# Patient Record
Sex: FEMALE | Race: WHITE | Employment: FULL TIME | URBAN - METROPOLITAN AREA
[De-identification: names, ages, dates, MRNs, and addresses within clinical notes are randomized per-mention and may not be internally consistent; named-entity substitution may affect disease eponyms.]

---

## 2020-08-02 ENCOUNTER — HOSPITAL ENCOUNTER (EMERGENCY)
Facility: CLINIC | Age: 55
Discharge: HOME | End: 2020-08-02
Attending: STUDENT IN AN ORGANIZED HEALTH CARE EDUCATION/TRAINING PROGRAM | Admitting: STUDENT IN AN ORGANIZED HEALTH CARE EDUCATION/TRAINING PROGRAM
Payer: COMMERCIAL

## 2020-08-02 VITALS
RESPIRATION RATE: 14 BRPM | TEMPERATURE: 98.2 F | DIASTOLIC BLOOD PRESSURE: 82 MMHG | SYSTOLIC BLOOD PRESSURE: 137 MMHG | HEART RATE: 72 BPM | OXYGEN SATURATION: 95 %

## 2020-08-02 DIAGNOSIS — R56.9 NEW ONSET SEIZURE (H): ICD-10-CM

## 2020-08-02 DIAGNOSIS — E16.2 HYPOGLYCEMIA: ICD-10-CM

## 2020-08-02 PROBLEM — Z53.1: Status: ACTIVE | Noted: 2020-08-02

## 2020-08-02 PROBLEM — E11.9 TYPE 2 DIABETES MELLITUS WITHOUT COMPLICATION (H): Status: ACTIVE | Noted: 2020-08-02

## 2020-08-02 PROBLEM — B00.1 HERPES SIMPLEX LABIALIS: Status: ACTIVE | Noted: 2020-08-02

## 2020-08-02 PROBLEM — F33.42 RECURRENT MAJOR DEPRESSION IN FULL REMISSION (H): Status: ACTIVE | Noted: 2020-08-02

## 2020-08-02 PROBLEM — Z79.4 LONG TERM CURRENT USE OF INSULIN (H): Status: ACTIVE | Noted: 2020-08-02

## 2020-08-02 PROBLEM — R29.810 WEAKNESS OF FACE MUSCLES: Status: ACTIVE | Noted: 2020-08-02

## 2020-08-02 LAB
ALBUMIN SERPL-MCNC: 3.8 G/DL (ref 3.4–5)
ALP SERPL-CCNC: 65 U/L (ref 40–150)
ALT SERPL W P-5'-P-CCNC: 21 U/L (ref 0–50)
ANION GAP SERPL CALCULATED.3IONS-SCNC: 9 MMOL/L (ref 3–14)
AST SERPL W P-5'-P-CCNC: 13 U/L (ref 0–45)
BASOPHILS # BLD AUTO: 0.1 10E9/L (ref 0–0.2)
BASOPHILS NFR BLD AUTO: 0.9 %
BILIRUB DIRECT SERPL-MCNC: <0.1 MG/DL (ref 0–0.2)
BILIRUB SERPL-MCNC: 0.3 MG/DL (ref 0.2–1.3)
BUN SERPL-MCNC: 11 MG/DL (ref 7–30)
CALCIUM SERPL-MCNC: 9.5 MG/DL (ref 8.5–10.1)
CHLORIDE SERPL-SCNC: 110 MMOL/L (ref 94–109)
CO2 SERPL-SCNC: 23 MMOL/L (ref 20–32)
CREAT SERPL-MCNC: 0.58 MG/DL (ref 0.52–1.04)
DIFFERENTIAL METHOD BLD: NORMAL
EOSINOPHIL # BLD AUTO: 0.2 10E9/L (ref 0–0.7)
EOSINOPHIL NFR BLD AUTO: 3.2 %
ERYTHROCYTE [DISTWIDTH] IN BLOOD BY AUTOMATED COUNT: 12.9 % (ref 10–15)
GFR SERPL CREATININE-BSD FRML MDRD: >90 ML/MIN/{1.73_M2}
GLUCOSE BLDC GLUCOMTR-MCNC: 202 MG/DL (ref 70–99)
GLUCOSE BLDC GLUCOMTR-MCNC: 211 MG/DL (ref 70–99)
GLUCOSE BLDC GLUCOMTR-MCNC: 53 MG/DL (ref 70–99)
GLUCOSE SERPL-MCNC: 51 MG/DL (ref 70–99)
HCG SERPL QL: NEGATIVE
HCT VFR BLD AUTO: 38.7 % (ref 35–47)
HGB BLD-MCNC: 13 G/DL (ref 11.7–15.7)
IMM GRANULOCYTES # BLD: 0 10E9/L (ref 0–0.4)
IMM GRANULOCYTES NFR BLD: 0.4 %
LYMPHOCYTES # BLD AUTO: 2.1 10E9/L (ref 0.8–5.3)
LYMPHOCYTES NFR BLD AUTO: 30.3 %
MCH RBC QN AUTO: 31.7 PG (ref 26.5–33)
MCHC RBC AUTO-ENTMCNC: 33.6 G/DL (ref 31.5–36.5)
MCV RBC AUTO: 94 FL (ref 78–100)
MONOCYTES # BLD AUTO: 0.5 10E9/L (ref 0–1.3)
MONOCYTES NFR BLD AUTO: 6.8 %
NEUTROPHILS # BLD AUTO: 4 10E9/L (ref 1.6–8.3)
NEUTROPHILS NFR BLD AUTO: 58.4 %
NRBC # BLD AUTO: 0 10*3/UL
NRBC BLD AUTO-RTO: 0 /100
PLATELET # BLD AUTO: 308 10E9/L (ref 150–450)
POTASSIUM SERPL-SCNC: 3.4 MMOL/L (ref 3.4–5.3)
PROT SERPL-MCNC: 7.3 G/DL (ref 6.8–8.8)
RBC # BLD AUTO: 4.1 10E12/L (ref 3.8–5.2)
SODIUM SERPL-SCNC: 142 MMOL/L (ref 133–144)
WBC # BLD AUTO: 6.8 10E9/L (ref 4–11)

## 2020-08-02 PROCEDURE — 00000146 ZZHCL STATISTIC GLUCOSE BY METER IP

## 2020-08-02 PROCEDURE — 99284 EMERGENCY DEPT VISIT MOD MDM: CPT | Mod: 25 | Performed by: STUDENT IN AN ORGANIZED HEALTH CARE EDUCATION/TRAINING PROGRAM

## 2020-08-02 PROCEDURE — 80076 HEPATIC FUNCTION PANEL: CPT | Performed by: EMERGENCY MEDICINE

## 2020-08-02 PROCEDURE — 96374 THER/PROPH/DIAG INJ IV PUSH: CPT | Performed by: STUDENT IN AN ORGANIZED HEALTH CARE EDUCATION/TRAINING PROGRAM

## 2020-08-02 PROCEDURE — 80048 BASIC METABOLIC PNL TOTAL CA: CPT | Performed by: EMERGENCY MEDICINE

## 2020-08-02 PROCEDURE — 25800025 ZZH RX 258: Performed by: EMERGENCY MEDICINE

## 2020-08-02 PROCEDURE — 93010 ELECTROCARDIOGRAM REPORT: CPT | Mod: Z6 | Performed by: STUDENT IN AN ORGANIZED HEALTH CARE EDUCATION/TRAINING PROGRAM

## 2020-08-02 PROCEDURE — 84703 CHORIONIC GONADOTROPIN ASSAY: CPT | Performed by: STUDENT IN AN ORGANIZED HEALTH CARE EDUCATION/TRAINING PROGRAM

## 2020-08-02 PROCEDURE — 85025 COMPLETE CBC W/AUTO DIFF WBC: CPT | Performed by: EMERGENCY MEDICINE

## 2020-08-02 PROCEDURE — 99285 EMERGENCY DEPT VISIT HI MDM: CPT | Mod: 25 | Performed by: STUDENT IN AN ORGANIZED HEALTH CARE EDUCATION/TRAINING PROGRAM

## 2020-08-02 PROCEDURE — 93005 ELECTROCARDIOGRAM TRACING: CPT | Performed by: STUDENT IN AN ORGANIZED HEALTH CARE EDUCATION/TRAINING PROGRAM

## 2020-08-02 RX ORDER — DEXTROSE MONOHYDRATE 25 G/50ML
50 INJECTION, SOLUTION INTRAVENOUS ONCE
Status: COMPLETED | OUTPATIENT
Start: 2020-08-02 | End: 2020-08-02

## 2020-08-02 RX ORDER — INSULIN ASPART 100 [IU]/ML
INJECTION, SOLUTION INTRAVENOUS; SUBCUTANEOUS
COMMUNITY
Start: 2020-01-23

## 2020-08-02 RX ORDER — DEXTROSE MONOHYDRATE 25 G/50ML
INJECTION, SOLUTION INTRAVENOUS
Status: DISCONTINUED
Start: 2020-08-02 | End: 2020-08-02 | Stop reason: HOSPADM

## 2020-08-02 RX ORDER — DEXTROSE MONOHYDRATE 25 G/50ML
50 INJECTION, SOLUTION INTRAVENOUS CONTINUOUS
Status: DISCONTINUED | OUTPATIENT
Start: 2020-08-02 | End: 2020-08-02 | Stop reason: HOSPADM

## 2020-08-02 RX ADMIN — DEXTROSE MONOHYDRATE 50 ML: 25 INJECTION, SOLUTION INTRAVENOUS at 11:59

## 2020-08-02 NOTE — ED AVS SNAPSHOT
[facility] Emergency Department  [address] [address]  Phone:  [phone]  Fax:  [phone]                                    Isabell Franks   MRN: [id number]    Department:  [facility] Emergency Department   Date of Visit:  8/2/2020           After Visit Summary Signature Page    I have received my discharge instructions, and my questions have been answered. I have discussed any challenges I see with this plan with the nurse or doctor.    ..........................................................................................................................................  Patient/Patient Representative Signature      ..........................................................................................................................................  Patient Representative Print Name and Relationship to Patient    ..................................................               ................................................  Date                                   Time    ..........................................................................................................................................  Reviewed by Signature/Title    ...................................................              ..............................................  Date                                               Time          22EPIC Rev 08/18

## 2020-08-02 NOTE — ED TRIAGE NOTES
Pt here via EMS for seizure. Pt was out to eat with family had a witnessed tonic clonic seizure. Pt did not hit head. Bit lip. Initial Blood glucose=78 recheck =87.18 g placed.

## 2020-08-02 NOTE — ED PROVIDER NOTES
"  History     Chief Complaint   Patient presents with     Seizures     had seizure, now post ictal, . now  A&O x3 BS 87     HPI  Isabell Franks is a 55 year old female with past medical history which includes diabetes mellitus who presents by EMS for evaluation after report of witnessed seizure activity.  Patient explains that she had been feeling well over the past few days but did drink a fair amount of alcohol last night during a wedding reception, \"ate a lot of food\" but believes she forgot to take your long-acting insulin before bed.  She woke this morning and obtained an elevated blood glucose reading in the 200s but does not recall the specific value, took 10 units of her short acting insulin before sitting down for breakfast.  Accompanying son states that he witnessed the patient began shaking and lose consciousness at around 10:45 AM, family helped her to the ground and witnessed generalized tonic-clonic activity for an estimated 4-5 minutes.  Afterwards they called the ambulance which transported her to the department.  Patient does not recall the incident, was reportedly confused until time of arrival, she has now alert and oriented.  She specifically denies recent fever, chills, headache, chest pain, shortness of breath, abdominal pain, or any active symptoms.  She denies history of regular alcohol use or any illicit drug use.  No history of seizure disorder although she admits to being diagnosed with Bell's palsy 2 weeks ago, no longer taking prescribed therapies.  Of note, patient had an MRI performed on 8/2/2020 which she says was ordered to rule out stroke.    Allergies:  No Known Allergies    Problem List:    Patient Active Problem List    Diagnosis Date Noted     Drug declined by patient due to patient beliefs 08/02/2020     Priority: Medium     Herpes simplex labialis 08/02/2020     Priority: Medium     Long term current use of insulin (H) 08/02/2020     Priority: Medium     Recurrent major " depression in full remission (H) 08/02/2020     Priority: Medium     Type 2 diabetes mellitus without complication (H) 08/02/2020     Priority: Medium     Weakness of face muscles 08/02/2020     Priority: Medium        Past Medical History:    No past medical history on file.    Past Surgical History:    No past surgical history on file.    Family History:    No family history on file.    Social History:  Marital Status:    Social History     Tobacco Use     Smoking status: Not on file   Substance Use Topics     Alcohol use: Not on file     Drug use: Not on file        Medications:    insulin aspart (NOVOLOG FLEXPEN) 100 UNIT/ML pen  insulin detemir (LEVEMIR FLEXTOUCH) 100 UNIT/ML pen  aspirin (ASPIRIN ADULT LOW DOSE) 81 MG EC tablet  FLUoxetine (PROZAC) 20 MG capsule          Review of Systems  Constitutional:  Negative for fever or recent illness.  Cardiovascular:  Negative for chest pain or palpitations.  Respiratory:  Negative for cough or sob.   Gastrointestinal:  Negative for abdominal pain, nausea or vomiting.   Genitourinary:  Negative for dysuria.  Musculoskeletal: Negative for neck or back pain.  Denies recent injury.  Neurological: Also defer report of seizure-like activity with loss of consciousness.  Patient denies headache, dizziness, weakness or sensory deficits.    All others reviewed and are negative.      Physical Exam   BP: 130/81  Pulse: 67  Heart Rate: 69  Temp: 98.2  F (36.8  C)  Resp: 18  SpO2: 98 %      Physical Exam  Constitutional:  Well developed, well nourished.  Appears nontoxic and in no acute distress.  Resting comfortably on the gurney.  HENT:  Normocephalic and atraumatic.  Symmetric in appearance.  Eyes:  Conjunctivae are normal.  Neck:  Neck supple.  Cardiovascular:  No cyanosis.  RRR.  No audible murmurs noted.    Respiratory:  Effort normal without sign of respiratory distress.  No audible wheezing or stridor.  CTAB.   Gastrointestinal:  Soft nondistended abdomen.  Nontender and  without guarding.  No rigidity or rebound tenderness.  Negative Iyer's sign.  Negative McBurney's point.    Musculoskeletal:  Moves extremities spontaneously.  Neurological:  Patient is alert and conversing appropriately without dysarthria or aphasia.  Skin:  Skin is warm and dry.  Psychiatric:  Normal mood and affect.      ED Course        Procedures      EXAM: MRI OF THE BRAIN WITHOUT AND WITH CONTRAST 7/2/2020 2:54 PM.    COMPARISON:  None.    CLINICAL INFORMATION: R29.810 Facial weakness    TECHNIQUE: Sagittal T1, axial DWI, axial T2, axial FLAIR, axial T1, axial MPGR, post gadolinium axial and coronal T1.      CONTRAST: 10 cc of intravenous Gadavist was administered during the exam.    FINDINGS:      BRAIN PARENCHYMA: There is no abnormal intracranial mass lesion, or recent hemorrhage. No restricted diffusion or other evidence of acute infarct. No abnormal gadolinium enhancement.         VENTRICLES, BRAIN VOLUME, EXTRA-AXIAL SPACES: Normal for age.    DEEP GRAY NUCLEI: No acute abnormality.    WHITE MATTER: Scattered foci of T2 signal hyperintensity are present, compatible with chronic gliosis from a remote vascular or inflammatory process.    POSTERIOR FOSSA: Brainstem and cerebellum demonstrate a normal appearance. No acute abnormality.    SKULL BASE: No abnormalities demonstrated.    SINUSES AND MASTOIDS: No significant disease.   Other Result Information   Interface, Nmhcradordrslt In - 07/02/2020  3:58 PM CDT  EXAM: MRI OF THE BRAIN WITHOUT AND WITH CONTRAST 7/2/2020 2:54 PM.    COMPARISON:  None.    CLINICAL INFORMATION: R29.810 Facial weakness    TECHNIQUE: Sagittal T1, axial DWI, axial T2, axial FLAIR, axial T1, axial MPGR, post gadolinium axial and coronal T1.      CONTRAST: 10 cc of intravenous Gadavist was administered during the exam.    FINDINGS:      BRAIN PARENCHYMA: There is no abnormal intracranial mass lesion, or recent hemorrhage. No restricted diffusion or other evidence of acute infarct.  No abnormal gadolinium enhancement.         VENTRICLES, BRAIN VOLUME, EXTRA-AXIAL SPACES: Normal for age.    DEEP GRAY NUCLEI: No acute abnormality.    WHITE MATTER: Scattered foci of T2 signal hyperintensity are present, compatible with chronic gliosis from a remote vascular or inflammatory process.    POSTERIOR FOSSA: Brainstem and cerebellum demonstrate a normal appearance. No acute abnormality.    SKULL BASE: No abnormalities demonstrated.    SINUSES AND MASTOIDS: No significant disease.    IMPRESSION  IMPRESSION:    1. No acute intracranial abnormality evident.  2. No evidence of intracranial mass or mass effect.  3. Nonspecific foci of T2 hyperintensity in the periventricular and subcortical white matter of the cerebral hemispheres. These are probably related to mild chronic microvascular ischemic change     REPORT  SIGNED BY Jose Quiroga M.D.                    EKG Interpretation:      Interpreted by: Nilson Bell  Time reviewed: Upon completion    Symptoms at time of EKG: Asymptomatic   Rhythm: Sinus  Rate: Normal  Axis: Normal    Conduction: None atypical   ST Segments/ T Waves: No pathologic ST-elevations or T-wave abnormalities.  Q Waves: None  Comparison to prior: Similar morphology to previous     Clinical Impression: No sign of ischemia or arrhythmia        Critical Care time:  none               Results for orders placed or performed during the hospital encounter of 08/02/20 (from the past 24 hour(s))   CBC with platelets differential   Result Value Ref Range    WBC 6.8 4.0 - 11.0 10e9/L    RBC Count 4.10 3.8 - 5.2 10e12/L    Hemoglobin 13.0 11.7 - 15.7 g/dL    Hematocrit 38.7 35.0 - 47.0 %    MCV 94 78 - 100 fl    MCH 31.7 26.5 - 33.0 pg    MCHC 33.6 31.5 - 36.5 g/dL    RDW 12.9 10.0 - 15.0 %    Platelet Count 308 150 - 450 10e9/L    Diff Method Automated Method     % Neutrophils 58.4 %    % Lymphocytes 30.3 %    % Monocytes 6.8 %    % Eosinophils 3.2 %    % Basophils 0.9 %    % Immature Granulocytes  0.4 %    Nucleated RBCs 0 0 /100    Absolute Neutrophil 4.0 1.6 - 8.3 10e9/L    Absolute Lymphocytes 2.1 0.8 - 5.3 10e9/L    Absolute Monocytes 0.5 0.0 - 1.3 10e9/L    Absolute Eosinophils 0.2 0.0 - 0.7 10e9/L    Absolute Basophils 0.1 0.0 - 0.2 10e9/L    Abs Immature Granulocytes 0.0 0 - 0.4 10e9/L    Absolute Nucleated RBC 0.0    Basic metabolic panel   Result Value Ref Range    Sodium 142 133 - 144 mmol/L    Potassium 3.4 3.4 - 5.3 mmol/L    Chloride 110 (H) 94 - 109 mmol/L    Carbon Dioxide 23 20 - 32 mmol/L    Anion Gap 9 3 - 14 mmol/L    Glucose 51 (L) 70 - 99 mg/dL    Urea Nitrogen 11 7 - 30 mg/dL    Creatinine 0.58 0.52 - 1.04 mg/dL    GFR Estimate >90 >60 mL/min/[1.73_m2]    GFR Estimate If Black >90 >60 mL/min/[1.73_m2]    Calcium 9.5 8.5 - 10.1 mg/dL   HCG qualitative pregnancy (blood)   Result Value Ref Range    HCG Qualitative Serum Negative NEG^Negative   Hepatic panel   Result Value Ref Range    Bilirubin Direct <0.1 0.0 - 0.2 mg/dL    Bilirubin Total 0.3 0.2 - 1.3 mg/dL    Albumin 3.8 3.4 - 5.0 g/dL    Protein Total 7.3 6.8 - 8.8 g/dL    Alkaline Phosphatase 65 40 - 150 U/L    ALT 21 0 - 50 U/L    AST 13 0 - 45 U/L   Glucose by meter   Result Value Ref Range    Glucose 53 (L) 70 - 99 mg/dL   Glucose by meter   Result Value Ref Range    Glucose 211 (H) 70 - 99 mg/dL   Glucose by meter   Result Value Ref Range    Glucose 202 (H) 70 - 99 mg/dL       Medications   dextrose 50 % injection 50 mL (has no administration in time range)   dextrose 50 % injection (has no administration in time range)   dextrose 50 % injection 50 mL (50 mLs Intravenous Given 8/2/20 1159)       Assessments & Plan (with Medical Decision Making)   Isabell Franks is a 55 year old female who presents to the department for evaluation after report of witnessed seizure-like activity followed by confused state consistent with postictal period.  She has no known history of seizure disorder or cardiac arrhythmia, she remained on cardiac  monitor without identifiable arrhythmia.  Initial blood glucose reading of 50 in the department consistent with hypoglycemia and likely contributing to suspected seizure.  Considered CT of head/brain but without headache or neurologic deficits likely to be of low utility, MRI without identifiable pathology performed within the past  month.  CBC and metabolic panel values near reference range.  Patient tolerated oral diet well and subsequent blood glucose readings >100.  She is quite certain that she missed her long-acting dose of insulin last night and adamant that she did not mix up her long and short acting dosages today.  She seems stable to return home but strongly recommend she continue checking blood glucose every 1-2 hours today and maintaining values >100.  She and accompanying son are in agreement with this plan as well as follow-up with primary care for reevaluation, management plan, and discussion regarding return to driving as she must refrain after seizure.        Disclaimer:  This note consists of symbols derived from keyboarding, dictation, and/or voice recognition software.  As a result, there may be errors in the script that have gone undetected.  Please consider this when interpreting information found in the chart.        I have reviewed the nursing notes.    I have reviewed the findings, diagnosis, plan and need for follow up with the patient.       Discharge Medication List as of 8/2/2020  2:21 PM          Final diagnoses:   New onset seizure (H)   Hypoglycemia       8/2/2020   [facility] EMERGENCY DEPARTMENT     Nilson Bell DO  08/02/20 1430